# Patient Record
Sex: MALE | Race: WHITE | NOT HISPANIC OR LATINO | URBAN - METROPOLITAN AREA
[De-identification: names, ages, dates, MRNs, and addresses within clinical notes are randomized per-mention and may not be internally consistent; named-entity substitution may affect disease eponyms.]

---

## 2024-08-08 ENCOUNTER — APPOINTMENT (OUTPATIENT)
Dept: URBAN - METROPOLITAN AREA CLINIC 204 | Age: 29
Setting detail: DERMATOLOGY
End: 2024-08-09

## 2024-08-08 DIAGNOSIS — L40.0 PSORIASIS VULGARIS: ICD-10-CM

## 2024-08-08 PROCEDURE — OTHER COUNSELING: OTHER

## 2024-08-08 PROCEDURE — OTHER MIPS QUALITY: OTHER

## 2024-08-08 PROCEDURE — 99203 OFFICE O/P NEW LOW 30 MIN: CPT

## 2024-08-08 ASSESSMENT — ITCH NUMERIC RATING SCALE: HOW SEVERE IS YOUR ITCHING?: 3

## 2024-08-08 ASSESSMENT — BSA PSORIASIS: % BODY COVERED IN PSORIASIS: 9

## 2024-08-08 NOTE — PROCEDURE: MIPS QUALITY
Additional Notes: Documentation for MIPS  purposes only. Full Patient visit note from paper chart is available for review and also scanned in EMA chart.
Detail Level: Detailed
Quality 226: Preventive Care And Screening: Tobacco Use: Screening And Cessation Intervention: Patient screened for tobacco use and is an ex/non-smoker